# Patient Record
Sex: MALE | Race: BLACK OR AFRICAN AMERICAN | NOT HISPANIC OR LATINO | Employment: FULL TIME | URBAN - METROPOLITAN AREA
[De-identification: names, ages, dates, MRNs, and addresses within clinical notes are randomized per-mention and may not be internally consistent; named-entity substitution may affect disease eponyms.]

---

## 2018-08-29 ENCOUNTER — OFFICE VISIT (OUTPATIENT)
Dept: URGENT CARE | Facility: PHYSICIAN GROUP | Age: 38
End: 2018-08-29

## 2018-08-29 VITALS
HEIGHT: 71 IN | OXYGEN SATURATION: 96 % | SYSTOLIC BLOOD PRESSURE: 132 MMHG | HEART RATE: 74 BPM | BODY MASS INDEX: 36.12 KG/M2 | WEIGHT: 258 LBS | TEMPERATURE: 97.6 F | DIASTOLIC BLOOD PRESSURE: 84 MMHG | RESPIRATION RATE: 16 BRPM

## 2018-08-29 DIAGNOSIS — Z02.89 ENCOUNTER FOR PHYSICAL EXAMINATION RELATED TO EMPLOYMENT: ICD-10-CM

## 2018-08-29 PROCEDURE — 7100 PR DOT PHYSICAL: Performed by: FAMILY MEDICINE

## 2018-08-29 ASSESSMENT — VISUAL ACUITY
OD_CC: 20/15
OS_CC: 20/30

## 2018-08-29 ASSESSMENT — PAIN SCALES - GENERAL: PAINLEVEL: NO PAIN

## 2023-02-17 ENCOUNTER — OFFICE VISIT (OUTPATIENT)
Dept: URGENT CARE | Facility: PHYSICIAN GROUP | Age: 43
End: 2023-02-17

## 2023-02-17 VITALS
SYSTOLIC BLOOD PRESSURE: 120 MMHG | OXYGEN SATURATION: 98 % | TEMPERATURE: 98.3 F | HEIGHT: 71 IN | BODY MASS INDEX: 35.99 KG/M2 | RESPIRATION RATE: 16 BRPM | DIASTOLIC BLOOD PRESSURE: 84 MMHG | WEIGHT: 257.1 LBS | HEART RATE: 71 BPM

## 2023-02-17 DIAGNOSIS — Z02.4 ENCOUNTER FOR COMMERCIAL DRIVING LICENSE (CDL) EXAM: ICD-10-CM

## 2023-02-17 LAB — GLUCOSE BLD-MCNC: 121 MG/DL (ref 65–99)

## 2023-02-17 PROCEDURE — 7100 PR DOT PHYSICAL: Performed by: NURSE PRACTITIONER

## 2023-02-17 PROCEDURE — 82962 GLUCOSE BLOOD TEST: CPT | Performed by: NURSE PRACTITIONER

## 2023-02-17 NOTE — PROGRESS NOTES
"Subjective     Luther Guadarrama is a 42 y.o. male who presents with Employment Physical (DOT)            HPI    ROS  CDL exam. Forms scanned to media.         Objective     /84   Pulse 71   Temp 36.8 °C (98.3 °F) (Temporal)   Resp 16   Ht 1.803 m (5' 11\")   Wt 117 kg (257 lb 1.6 oz)   SpO2 98%   BMI 35.86 kg/m²      Physical Exam                        Assessment & Plan        1. 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  Cleared for 2 years.                  "

## 2025-02-07 ENCOUNTER — OFFICE VISIT (OUTPATIENT)
Dept: URGENT CARE | Facility: PHYSICIAN GROUP | Age: 45
End: 2025-02-07

## 2025-02-07 VITALS
BODY MASS INDEX: 33.86 KG/M2 | SYSTOLIC BLOOD PRESSURE: 124 MMHG | HEIGHT: 70 IN | HEART RATE: 70 BPM | OXYGEN SATURATION: 98 % | WEIGHT: 236.5 LBS | DIASTOLIC BLOOD PRESSURE: 84 MMHG | RESPIRATION RATE: 14 BRPM | TEMPERATURE: 97.6 F

## 2025-02-07 DIAGNOSIS — Z02.89 ENCOUNTER FOR EXAMINATION REQUIRED BY DEPARTMENT OF TRANSPORTATION (DOT): ICD-10-CM

## 2025-02-07 PROCEDURE — 7100 PR DOT PHYSICAL: Performed by: NURSE PRACTITIONER

## 2025-02-07 NOTE — LETTER
February 7, 2025         Patient: Luther Guadarrama   YOB: 1980   Date of Visit: 2/7/2025           To Whom it May Concern:    Luther Guadarrama was seen in my clinic on 2/7/2025 from ______ to ______.    If you have any questions or concerns, please don't hesitate to call.        Sincerely,         LASHONDA Phoenix.  Electronically Signed

## 2025-02-07 NOTE — PROGRESS NOTES
"Subjective:     Luther Guadarrama is a 44 y.o. male who presents for Employment Physical ([employer] )       Patient presents for a DOT physical exam. Refer to paper documentation scanned into electronic health record under \"Media.\"    Seen in  2/17/2023 and certified for 2 years.    During this visit, appropriate PPE was worn and hand hygiene was performed.    PMH:  has no past medical history on file.    MEDS: No current outpatient medications on file.    ALLERGIES:   Allergies   Allergen Reactions    Penicillins Swelling     SURGHX: History reviewed. No pertinent surgical history.  SOCHX:  reports that he has never smoked. He has never used smokeless tobacco. He reports current alcohol use.     FH: Reviewed with patient, not pertinent to this visit.    ROS reviewed. Refer to paper documentation scanned into electronic health record under \"Media.\"      Objective:     /84   Pulse 70   Temp 36.4 °C (97.6 °F) (Temporal)   Resp 14   Ht 1.778 m (5' 10\")   Wt 107 kg (236 lb 8 oz)   SpO2 98%   BMI 33.93 kg/m²     Physical exam satisfactory. Refer to paper documentation scanned into electronic health record under \"Media.\"      Assessment/Plan:     1. Encounter for examination required by Department of Transportation (DOT)    Patient qualifies for a 2 year certificate. Must wear corrective lenses. Refer to paper documentation scanned into electronic health record under \"Media.\"    Submitted online to [facility]/National Registry.  "